# Patient Record
Sex: MALE | Race: WHITE | Employment: OTHER | URBAN - METROPOLITAN AREA
[De-identification: names, ages, dates, MRNs, and addresses within clinical notes are randomized per-mention and may not be internally consistent; named-entity substitution may affect disease eponyms.]

---

## 2019-08-29 ENCOUNTER — HOSPITAL ENCOUNTER (EMERGENCY)
Facility: CLINIC | Age: 69
Discharge: HOME | End: 2019-08-29
Attending: EMERGENCY MEDICINE | Admitting: EMERGENCY MEDICINE
Payer: COMMERCIAL

## 2019-08-29 ENCOUNTER — APPOINTMENT (OUTPATIENT)
Dept: CT IMAGING | Facility: CLINIC | Age: 69
End: 2019-08-29
Attending: EMERGENCY MEDICINE
Payer: COMMERCIAL

## 2019-08-29 ENCOUNTER — APPOINTMENT (OUTPATIENT)
Dept: ULTRASOUND IMAGING | Facility: CLINIC | Age: 69
End: 2019-08-29
Attending: EMERGENCY MEDICINE
Payer: COMMERCIAL

## 2019-08-29 VITALS
OXYGEN SATURATION: 98 % | HEIGHT: 70 IN | WEIGHT: 220 LBS | TEMPERATURE: 97.9 F | RESPIRATION RATE: 18 BRPM | SYSTOLIC BLOOD PRESSURE: 140 MMHG | HEART RATE: 60 BPM | DIASTOLIC BLOOD PRESSURE: 70 MMHG | BODY MASS INDEX: 31.5 KG/M2

## 2019-08-29 DIAGNOSIS — R31.29 MICROSCOPIC HEMATURIA: ICD-10-CM

## 2019-08-29 DIAGNOSIS — N50.811 RIGHT TESTICULAR PAIN: ICD-10-CM

## 2019-08-29 LAB
ALBUMIN UR-MCNC: NEGATIVE MG/DL
APPEARANCE UR: ABNORMAL
BILIRUB UR QL STRIP: NEGATIVE
CAOX CRY #/AREA URNS HPF: ABNORMAL /HPF
COLOR UR AUTO: YELLOW
GLUCOSE UR STRIP-MCNC: NEGATIVE MG/DL
HGB UR QL STRIP: ABNORMAL
KETONES UR STRIP-MCNC: NEGATIVE MG/DL
LEUKOCYTE ESTERASE UR QL STRIP: NEGATIVE
MUCOUS THREADS #/AREA URNS LPF: PRESENT /LPF
NITRATE UR QL: NEGATIVE
PH UR STRIP: 5.5 PH (ref 5–7)
RBC #/AREA URNS AUTO: 41 /HPF (ref 0–2)
SOURCE: ABNORMAL
SP GR UR STRIP: 1.02 (ref 1–1.03)
SQUAMOUS #/AREA URNS AUTO: <1 /HPF (ref 0–1)
UROBILINOGEN UR STRIP-MCNC: NORMAL MG/DL (ref 0–2)
WBC #/AREA URNS AUTO: 1 /HPF (ref 0–5)

## 2019-08-29 PROCEDURE — 93976 VASCULAR STUDY: CPT

## 2019-08-29 PROCEDURE — 81001 URINALYSIS AUTO W/SCOPE: CPT | Performed by: EMERGENCY MEDICINE

## 2019-08-29 PROCEDURE — 74176 CT ABD & PELVIS W/O CONTRAST: CPT

## 2019-08-29 PROCEDURE — 99285 EMERGENCY DEPT VISIT HI MDM: CPT | Mod: 25 | Performed by: EMERGENCY MEDICINE

## 2019-08-29 PROCEDURE — 99284 EMERGENCY DEPT VISIT MOD MDM: CPT | Mod: Z6 | Performed by: EMERGENCY MEDICINE

## 2019-08-29 PROCEDURE — 25000132 ZZH RX MED GY IP 250 OP 250 PS 637: Performed by: EMERGENCY MEDICINE

## 2019-08-29 RX ORDER — ACETAMINOPHEN 325 MG/1
975 TABLET ORAL ONCE
Status: COMPLETED | OUTPATIENT
Start: 2019-08-29 | End: 2019-08-29

## 2019-08-29 RX ORDER — IBUPROFEN 600 MG/1
600 TABLET, FILM COATED ORAL ONCE
Status: COMPLETED | OUTPATIENT
Start: 2019-08-29 | End: 2019-08-29

## 2019-08-29 RX ORDER — TAMSULOSIN HYDROCHLORIDE 0.4 MG/1
0.4 CAPSULE ORAL DAILY
COMMUNITY

## 2019-08-29 RX ADMIN — IBUPROFEN 600 MG: 600 TABLET ORAL at 08:37

## 2019-08-29 RX ADMIN — ACETAMINOPHEN 975 MG: 325 TABLET, FILM COATED ORAL at 08:36

## 2019-08-29 ASSESSMENT — ENCOUNTER SYMPTOMS
FEVER: 0
HEMATURIA: 0
DIFFICULTY URINATING: 0
DYSURIA: 0

## 2019-08-29 ASSESSMENT — MIFFLIN-ST. JEOR: SCORE: 1774.16

## 2019-08-29 NOTE — ED TRIAGE NOTES
Pt arrived in triage with concerns of R testicle pain since 0300 this morning. Pt says he hasn't had pain like this before. Pt denies changes in sexual activity or any trauma. Urinating regularly. Afebrile.

## 2019-08-29 NOTE — ED AVS SNAPSHOT
[facility], [place], Emergency Department  [address] [place] [id number]  Phone:  [phone]                                    Cody Torres   MRN: [id number]    Department:  [facility], Emergency Department   Date of Visit:  8/29/2019           After Visit Summary Signature Page    I have received my discharge instructions, and my questions have been answered. I have discussed any challenges I see with this plan with the nurse or doctor.    ..........................................................................................................................................  Patient/Patient Representative Signature      ..........................................................................................................................................  Patient Representative Print Name and Relationship to Patient    ..................................................               ................................................  Date                                   Time    ..........................................................................................................................................  Reviewed by Signature/Title    ...................................................              ..............................................  Date                                               Time          22EPIC Rev 08/18

## 2019-08-29 NOTE — ED PROVIDER NOTES
History     Chief Complaint   Patient presents with     Groin Pain     HPI  Cody Torres is a 68 year old male who presents with acute nontraumatic right testicular pain that started about 6 hours he rates it at 5 of 10 is not noted swelling no dysuria no abnormal discharge no fevers or chills.  No blood in urine is never had symptoms like this in the past.    Past Medical History:   Diagnosis Date     Diabetes (H)        Social History     Socioeconomic History     Marital status:      Spouse name: Not on file     Number of children: Not on file     Years of education: Not on file     Highest education level: Not on file   Occupational History     Not on file   Social Needs     Financial resource strain: Not on file     Food insecurity:     Worry: Not on file     Inability: Not on file     Transportation needs:     Medical: Not on file     Non-medical: Not on file   Tobacco Use     Smoking status: Never Smoker     Smokeless tobacco: Never Used   Substance and Sexual Activity     Alcohol use: Not on file     Drug use: Never     Sexual activity: Yes   Lifestyle     Physical activity:     Days per week: Not on file     Minutes per session: Not on file     Stress: Not on file   Relationships     Social connections:     Talks on phone: Not on file     Gets together: Not on file     Attends Spiritism service: Not on file     Active member of club or organization: Not on file     Attends meetings of clubs or organizations: Not on file     Relationship status: Not on file     Intimate partner violence:     Fear of current or ex partner: Not on file     Emotionally abused: Not on file     Physically abused: Not on file     Forced sexual activity: Not on file   Other Topics Concern     Not on file   Social History Narrative     Not on file         I have reviewed the Medications, Allergies, Past Medical and Surgical History, and Social History in the Epic system.    Review of Systems   Constitutional: Negative  "for fever.   Genitourinary: Positive for testicular pain. Negative for difficulty urinating, discharge, dysuria, genital sores, hematuria, penile pain, penile swelling and scrotal swelling.   All other systems reviewed and are negative.      Physical Exam   BP: (!) 168/89  Pulse: 82  Temp: 97.9  F (36.6  C)  Resp: 18  Height: 177.8 cm (5' 10\")  Weight: 99.8 kg (220 lb)  SpO2: 97 %      Physical Exam   Constitutional: He is oriented to person, place, and time. He appears well-nourished. No distress.   Pulmonary/Chest: No respiratory distress.   Genitourinary: Right testis shows tenderness.         Genitourinary Comments: Right epididymal tenderness   Neurological: He is alert and oriented to person, place, and time.   Psychiatric: He has a normal mood and affect. His behavior is normal.   Nursing note and vitals reviewed.      ED Course        Procedures        Medications   acetaminophen (TYLENOL) tablet 975 mg (975 mg Oral Given 8/29/19 0836)   ibuprofen (ADVIL/MOTRIN) tablet 600 mg (600 mg Oral Given 8/29/19 0837)            Labs Ordered and Resulted from Time of ED Arrival Up to the Time of Departure from the ED   ROUTINE UA WITH MICROSCOPIC - Abnormal; Notable for the following components:       Result Value    Blood Urine Moderate (*)     RBC Urine 41 (*)     Mucous Urine Present (*)     Calcium Oxalate Moderate (*)     All other components within normal limits            Assessments & Plan (with Medical Decision Making)   Cody Torres is a pleasant 68-year-old generally quite healthy, presented with acute right testicular pain. His exam was unremarkable his testicular ultrasound was normal he did have microscopic hematuria so I proceeded to CT scan. He has a small calculus in the right kidney but no evidence for recurrent ureteral or bladder stone.  It is possible that he did pass a small stone. I gave him the referred pain to the testicle and also microscopic hematuria. We did have a long discussion about " the importance of follow-up and patient hematuria is cancer until proven otherwise. I will give the number the urology clinic he can go to his own clinic and get a repeat UA if it shows 0 red cells. He is probably good but anything else he requires a direct referral to the urology clinic.  This part of the medical record was transcribed by Marck Vora Medical Scribe, from a dictation done by Cody Romano MD.     I have reviewed the nursing notes.    I have reviewed the findings, diagnosis, plan and need for follow up with the patient.    Discharge Medication List as of 8/29/2019  1:22 PM          Final diagnoses:   Right testicular pain   Microscopic hematuria       8/29/2019   [facility], [place], EMERGENCY DEPARTMENT     Cody Romano MD  09/02/19 0948

## 2019-08-29 NOTE — DISCHARGE INSTRUCTIONS
The ultrasound of the testicles was normal  The CT scan showed no abnormality  Your urinalysis showed microscopic hematuria, this needs to be investigated by a urologist.  See the number below to schedule an appointment.  Please make an appointment to follow up with Urology Clinic (phone: [phone]) as soon as possible.

## 2019-10-03 ENCOUNTER — HEALTH MAINTENANCE LETTER (OUTPATIENT)
Age: 69
End: 2019-10-03

## 2020-02-08 ENCOUNTER — HEALTH MAINTENANCE LETTER (OUTPATIENT)
Age: 70
End: 2020-02-08

## 2020-11-07 ENCOUNTER — HEALTH MAINTENANCE LETTER (OUTPATIENT)
Age: 70
End: 2020-11-07

## 2021-03-27 ENCOUNTER — HEALTH MAINTENANCE LETTER (OUTPATIENT)
Age: 71
End: 2021-03-27

## 2021-09-05 ENCOUNTER — HEALTH MAINTENANCE LETTER (OUTPATIENT)
Age: 71
End: 2021-09-05

## 2021-10-07 ENCOUNTER — ANCILLARY PROCEDURE (OUTPATIENT)
Dept: GENERAL RADIOLOGY | Facility: CLINIC | Age: 71
End: 2021-10-07
Attending: FAMILY MEDICINE
Payer: COMMERCIAL

## 2021-10-07 ENCOUNTER — OFFICE VISIT (OUTPATIENT)
Dept: URGENT CARE | Facility: URGENT CARE | Age: 71
End: 2021-10-07
Payer: COMMERCIAL

## 2021-10-07 VITALS
OXYGEN SATURATION: 97 % | DIASTOLIC BLOOD PRESSURE: 72 MMHG | HEART RATE: 85 BPM | SYSTOLIC BLOOD PRESSURE: 131 MMHG | TEMPERATURE: 97.7 F

## 2021-10-07 DIAGNOSIS — M25.572 ACUTE LEFT ANKLE PAIN: Primary | ICD-10-CM

## 2021-10-07 DIAGNOSIS — M25.572 ACUTE LEFT ANKLE PAIN: ICD-10-CM

## 2021-10-07 DIAGNOSIS — S82.831A CLOSED FRACTURE OF DISTAL END OF RIGHT FIBULA, UNSPECIFIED FRACTURE MORPHOLOGY, INITIAL ENCOUNTER: ICD-10-CM

## 2021-10-07 DIAGNOSIS — S09.90XA CLOSED HEAD INJURY, INITIAL ENCOUNTER: ICD-10-CM

## 2021-10-07 PROCEDURE — 99204 OFFICE O/P NEW MOD 45 MIN: CPT | Performed by: FAMILY MEDICINE

## 2021-10-07 PROCEDURE — 73610 X-RAY EXAM OF ANKLE: CPT | Mod: LT | Performed by: RADIOLOGY

## 2021-10-08 NOTE — PATIENT INSTRUCTIONS
Patient Education     Ankle Fracture, Distal Fibula  You have a fracture, or broken bone, of the end of the fibula bone. The fibula is one of two bones that support the ankle joint.     Home care    You will be given a splint, cast, or special boot to prevent movement at the injury site. Do not put weight on a splint. It will break. Follow your healthcare provider s advice about when to begin bearing weight on a cast or boot.    Keep your leg raised when sitting or lying down. When sleeping, place a pillow under the injured leg. When sitting, support the injured leg so it is above heart level. This is very important during the first 48 hours.    Keep the cast or splint completely dry at all times. When bathing, protect the cast or splint with 2 large plastic bags. Place 1 bag outside of the other. Tape each bag with duct tape at the top end or use rubber bands. Water can still leak in even when the foot is covered. So it's best to keep the cast, splint, or boot away from water. If a fiberglass cast or splint gets wet, dry it with a hair dryer on a cool setting.    Place an ice pack over the injured area for no more than 15 to 20 minutes. Do this every 3 to 6 hours for the first 24 to 48 hours. Continue this 3 to 4 times a day as needed. To make an ice pack, put ice cubes in a plastic bag that seals at the top. Wrap the bag in a clean, thin towel or cloth. Never put ice or an ice pack directly on the skin. The ice pack can be put right on the cast or splint. As the ice melts, be careful that the cast or splint doesn t get wet.    You may use over-the-counter pain medicine to control pain, unless another pain medicine was prescribed. If you have chronic liver or kidney disease or ever had a stomach ulcer or GI bleeding, talk with your provider before using these medicines.    Follow-up care  Follow up with your healthcare provider in 1 week, or as advised. This is to be sure the bone is healing properly. If you were  given a splint, it may be changed to a cast or boot after the swelling goes down.   If X-rays were taken, you will be told of any new findings that may affect your care.  When to seek medical advice  Call your healthcare provider right away if any of these occur:    The plaster cast or splint becomes wet or soft    The fiberglass cast or splint stays wet for more than 24 hours    There is increased tightness or pain under the cast or splint    Your toes become swollen, cold, blue, numb, or tingly    The cast or splint becomes loose    The cast or splint has a bad smell    Sore areas develop under the cast or splint    The cast or splint develops cracks or breaks   [employer] last reviewed this educational content on 4/1/2018 2000-2021 [employer]. All rights reserved. This information is not intended as a substitute for professional medical care. Always follow your healthcare professional's instructions.      Patient Education     Head Trauma (Traumatic Brain Injury)     After treatment for head trauma, know which symptoms to watch for. Then call for medical help.     Head trauma can be fatal. The effects from some types of head trauma may not appear right away. So it s important to get immediate medical attention for any head injury.   Don't move a person with a head injury unless it is necessary to save his or her life. Call 911 and wait for help. Head trauma often comes with severe neck injury. Sudden movements can result in paralysis.   Call 911  Call 911 immediately after a head blow that results in:     Prolonged loss of consciousness (more than a few seconds) or prolonged drowsiness    Memory problems or confusion    Severe headache    Nausea or vomiting    Pupils dilated or different sizes    Severe bleeding    Blood or watery fluid leaking from nose or ears    Broken skull or a soft spot on skull    Slow breathing    Loss of balance    Weakness of or trouble using an arm or leg    Slurred  speech    Seizure  What to expect in the ER  Here is what will happen:     A neurological exam is done. This is a series of simple questions and tests that evaluate the nervous system. Reflexes, movement, response to commands, response to pain, and mental state are assessed.    The healthcare provider shines a bright light into the eyes to check how the pupils respond. This can reveal more about any head injuries.    A CT scan may be done. This test combines X-rays and computer scans to create detailed images of the brain to detect bleeding, swelling, brain injury, and skull fractures    An MRI scan may be done. This test detects minute bleeding (microhemorrhage), bruising, and scarring which may not be visible on CT scanning.  Treatment for head trauma  Here is what is generally done:     Sometimes, severe head injuries cause bleeding on the brain that needs to be treated right away with surgery. In certain cases, the injured person will be watched closely and taken for surgery only if injuries become worse. After surgery, special care helps prevent further brain damage.    Minor head trauma may need little treatment beyond pain control and observation. The healthcare provider may suggest using cold packs to reduce swelling and pain.  Once you are home  At home, call 911 immediately if the affected person:     Becomes very drowsy or confused    Has a headache or trouble seeing    Has a stiff neck or muscle weakness    Vomits    Has seizures    Has bruising around the eyes or behind the ears    Has any blood or clear fluid coming out of the ears or nose  Beka last reviewed this educational content on 5/1/2018 2000-2021 [employer]. All rights reserved. This information is not intended as a substitute for professional medical care. Always follow your healthcare professional's instructions.

## 2021-10-08 NOTE — PROGRESS NOTES
HPI:Cody Torres is a 70 year old male here today with complaint of left tankle pain    Accompanied by wife    Just a couple of hours prior to me seeing him  Patient was cleaning a low deck , he slipped and fell landed mostly on his leg and slightly landed backward having mild head trauma (per patient minimal)  Slipped from a height of about 2.5 feet  He states he was bracing himself as he was coming down and his left ankle took most of the fall     head injury:No  loss of consciousness:  No  syncope or presyncope: No  chest pain or palpitation: No  mechanical fall:  Yes  using assistive devices:  No  blood thinners: No  Pregnant: No    ROS:  as per hpi  denies headache  denies any nausea or vomiting  denies any amnesia, confusion or concussion symptoms  denies any blurring of vision  denies any otorrhea or rhinorhea  denies any neck pain  denies any back pain.  denies any chest pain or shortness of breath  denies any joint pain except noted above.  denies any bowel or bladder incontinence or motor or sensory deficits.  denies any abdominal pain, nausea or vomiting or flank pain  denies any hematuria      Allergies   Allergen Reactions     Amoxicillin Rash     Levaquin [Levofloxacin] Palpitations     Morphine Rash     Tetracycline Rash       Past Medical History:   Diagnosis Date     Diabetes (H)        metFORMIN (GLUCOPHAGE) 1000 MG tablet, Take 1,000 mg by mouth 2 times daily (with meals)  tamsulosin (FLOMAX) 0.4 MG capsule, Take 0.4 mg by mouth daily    No current facility-administered medications on file prior to visit.      Social History     Tobacco Use     Smoking status: Never Smoker     Smokeless tobacco: Never Used   Substance Use Topics     Alcohol use: Not on file     Drug use: Never       ROS:  10 point review of systems negative except for noted above.   No thoughts of harming self or others.     OBJECTIVE:  /72   Pulse 85   Temp 97.7  F (36.5  C) (Tympanic)   SpO2 97%    General:    awake, alert, and cooperative.  NAD.   Head: Normocephalic, atraumatic.  Eyes: Conjunctiva clear,   ENT: no periorbital ecchymosis, no otorrhea or rhinorrhea, negative Garcia's sign, no raccoon eyes, no hematympanum  Heart: Regular rate and rhythm. No murmur.  Lungs: Chest is clear; no wheezes or rales.   Abdomen: soft non-tender. No bruising noted.   Neuro: Alert and oriented - normal speech. Cranial nerves intact, MMT 5/5 all extremities, sensory intact, normal gait and normal cerebellar function  MS: Using extremities freely except for left leg , No cervical, thoracic, or lumbar spine tenderness  Ankle Exam (left):  Inspection:swelling around the lateral malleolus  Palpation:tender over lateral malleolus no tenderness on navicular bone or 5th metatarsal. No proximal fibular tenderness. No calf tenderness. Achilles tendon is intact  Cap refill intact.    Good doralis pedis.  Neurovascularly Intact Distally.   PSYCH:  Normal affect, normal speech  SKIN: no obvious rashes    xrays to my review positive distal fibular fracture     ASSESSMENT:    ICD-10-CM    1. Acute left ankle pain  M25.572 XR Ankle Left G/E 3 Views   2. Closed head injury, initial encounter  S09.90XA    3. Closed fracture of distal end of right fibula, unspecified fracture morphology, initial encounter  S82.831A Orthopedic  Referral     Rolling Knee Walker Order for DME - ONLY FOR DME       PLAN:   Head injury - I recommended ER patient declined.  He states he's very certain it was a low impact fall  I stated given his age it is best for him to get a CT but he declines   Risks discussed  Signs and symptoms of concussion discussed. If with worsening symptoms or concerns proceed to ER  Aware to go to the ER if with worsening symptoms of headache, nausea or vomiting, chest pain or shortness of breath, bowel/bladder incontinence, motor or sensory deficits, bloody urine, changes in behavior, confusion, difficulty walking or seizure  Advised  about symptoms which might herald more serious problems.      Left ankle /distal fibular fracture  Given tall black camwalker  Crutches tonight - may try to go to [employer] store to get a rolling knee walker  Referred to orthopedics for follow up  DVT prevention discussed  Signs or symptoms of DVT reviewed if concerns he will go tto ER       Stephanie Blackburn MD

## 2021-10-15 ENCOUNTER — OFFICE VISIT (OUTPATIENT)
Dept: PODIATRY | Facility: CLINIC | Age: 71
End: 2021-10-15
Attending: FAMILY MEDICINE
Payer: COMMERCIAL

## 2021-10-15 VITALS
SYSTOLIC BLOOD PRESSURE: 130 MMHG | DIASTOLIC BLOOD PRESSURE: 79 MMHG | BODY MASS INDEX: 31.5 KG/M2 | WEIGHT: 220 LBS | HEART RATE: 91 BPM | HEIGHT: 70 IN

## 2021-10-15 DIAGNOSIS — S82.65XA CLOSED NONDISPLACED FRACTURE OF LATERAL MALLEOLUS OF LEFT FIBULA, INITIAL ENCOUNTER: Primary | ICD-10-CM

## 2021-10-15 PROCEDURE — 99203 OFFICE O/P NEW LOW 30 MIN: CPT | Performed by: PODIATRIST

## 2021-10-15 RX ORDER — LANOLIN ALCOHOL/MO/W.PET/CERES
1000 CREAM (GRAM) TOPICAL DAILY
COMMUNITY
Start: 2021-02-05

## 2021-10-15 RX ORDER — BLOOD SUGAR DIAGNOSTIC
STRIP MISCELLANEOUS
COMMUNITY
Start: 2020-11-03

## 2021-10-15 ASSESSMENT — PAIN SCALES - GENERAL: PAINLEVEL: MILD PAIN (3)

## 2021-10-15 ASSESSMENT — MIFFLIN-ST. JEOR: SCORE: 1764.16

## 2021-10-15 NOTE — PROGRESS NOTES
PATIENT HISTORY:  Cody Torres is a 70 year old male who presents with a chief complaint of a painful left ankle.  The patient relates injuring the left ankle on 10/07/2021 while at mother in law's house.  The patient states that cleaning the deck and slipped and fell  The patient relates pain with weight bearing to the left.   The patient relates being seen and treated with ice, elevation, and nonweightbearing with crutches.  The patient denies any numbness to the toes on the left foot.    REVIEW OF SYSTEMS:  Constitutional, HEENT, cardiovascular, pulmonary, GI, , musculoskeletal, neuro, skin, endocrine and psych systems are negative, except as otherwise noted.     PAST MEDICAL HISTORY:   Past Medical History:   Diagnosis Date     Diabetes (H)         PAST SURGICAL HISTORY: No past surgical history on file.     MEDICATIONS:   Current Outpatient Medications:      metFORMIN (GLUCOPHAGE) 1000 MG tablet, Take 1,000 mg by mouth 2 times daily (with meals), Disp: , Rfl:      tamsulosin (FLOMAX) 0.4 MG capsule, Take 0.4 mg by mouth daily, Disp: , Rfl:      ALLERGIES:    Allergies   Allergen Reactions     Amoxicillin Rash     Levaquin [Levofloxacin] Palpitations     Morphine Rash     Tetracycline Rash        SOCIAL HISTORY:   Social History     Socioeconomic History     Marital status:      Spouse name: Not on file     Number of children: Not on file     Years of education: Not on file     Highest education level: Not on file   Occupational History     Not on file   Tobacco Use     Smoking status: Never Smoker     Smokeless tobacco: Never Used   Substance and Sexual Activity     Alcohol use: Not on file     Drug use: Never     Sexual activity: Yes   Other Topics Concern     Not on file   Social History Narrative     Not on file     Social Determinants of Health     Financial Resource Strain:      Difficulty of Paying Living Expenses:    Food Insecurity:      Worried About Running Out of Food in the Last Year:       Ran Out of Food in the Last Year:    Transportation Needs:      Lack of Transportation (Medical):      Lack of Transportation (Non-Medical):    Physical Activity:      Days of Exercise per Week:      Minutes of Exercise per Session:    Stress:      Feeling of Stress :    Social Connections:      Frequency of Communication with Friends and Family:      Frequency of Social Gatherings with Friends and Family:      Attends Denominational Services:      Active Member of Clubs or Organizations:      Attends Club or Organization Meetings:      Marital Status:    Intimate Partner Violence:      Fear of Current or Ex-Partner:      Emotionally Abused:      Physically Abused:      Sexually Abused:         FAMILY HISTORY: No family history on file.     EXAM:Vitals: There were no vitals taken for this visit.  BMI= There is no height or weight on file to calculate BMI.     General appearance: Patient is alert and fully cooperative with history & exam.  No sign of distress is noted during the visit.     Psychiatric: Affect is pleasant & appropriate.  Patient appears motivated to improve health.     Respiratory: Breathing is regular & unlabored while sitting.     HEENT: Hearing is intact to spoken word.  Speech is clear.  No gross evidence of visual impairment that would impact ambulation.     Dermatologic: Skin is intact with no laceration for fracture blisters.        Vascular: DP & PT pulses are difficult to palpate due to the edema.  CFT and skin temperature is normal to both lower extremities.     Neurologic: Lower extremity sensation is intact to light touch.  No evidence of weakness or contracture in the lower extremities.        Musculoskeletal: One notes decreased ankle joint ROM due to swelling and pain on the left.  Point of maximum tenderness located over the  lateral aspect of the left ankle.  One notes no pain with palpation over the medial deltoid ligament on the left.  Moderate edema noted.  Positive ecchymosis  noted.      Radiograph evaluation including AP, lateral and mortise views of the left ankle reveals a spiral oblique fracture of the lateral malleolus extending at the level of the ankle mortise proximally and posteriorly.  No evidence of a transverse medial malleolar fracture.     ASSESSMENT / PLAN:     ICD-10-CM    1. Closed nondisplaced fracture of lateral malleolus of left fibula, initial encounter  S82.65XA        I have explained to Cody  about the conditions.  We discussed the nature of the condition as well as the treatment plan and expected length of recovery.  At this point, I am recommending conservative treatment of the ankle fracture.  The patient was instructed on continued offloading of the left ankle.  The patient was instructed to perform light range of motion exercises in a bucket of warm water to promote healing.  The patient will return in one month for reevaluation and repeat x-rays.    Cody verbalized agreement with and understanding of the rational for the diagnosis and treatment plan.  All questions were answered to best of my ability and the patient's satisfaction. The patient was advised to contact the clinic with any questions that may arise after the clinic visit.      Disclaimer: This note consists of symbols derived from keyboarding, dictation and/or voice recognition software. As a result, there may be errors in the script that have gone undetected. Please consider this when interpreting information found in this chart.       DANYEL Alfaro D.P.M., F.TAZ.C.F.A.S.

## 2021-10-15 NOTE — NURSING NOTE
"Chief Complaint   Patient presents with     Fracture     left ankle       Initial /79   Pulse 91   Ht 1.778 m (5' 10\")   Wt 99.8 kg (220 lb)   BMI 31.57 kg/m   Estimated body mass index is 31.57 kg/m  as calculated from the following:    Height as of this encounter: 1.778 m (5' 10\").    Weight as of this encounter: 99.8 kg (220 lb).  Medications and allergies reviewed.      Iris VENTURA MA    "

## 2021-10-15 NOTE — PATIENT INSTRUCTIONS
Caring for your injured foot or ankle    1. 1-3 days after injury  a. Rest and immobilize the injured foot or ankle.  b. Ice injured area 20/hour during the day if possible.  c. Compression, such as an ace wrap, will help reduce swelling.  d. Elevate the injured foot/ankle as much as possible  2. 4 days to 4 weeks after injury  a. Contrast soaking  i. Get two 5-gallon buckets, one ice water the other warm water  ii. Start soaking the injured foot/ankle in the ice water for up to 5 minutes then switch to the warm water for 5 minutes  iii. Repeat this once then finish with one more soak in the ice water  b. Perform simple range of motion exercises while soaking  c. Massage the injured tissue gently with a topical muscle rub  d. Weight-bear as tolerated depending upon pain (this is not the type of pain that you should push through)  3. 4+ weeks from injury  a. Increase activity as tolerated  b. Wear supportive shoes to  offload the tension forces and prevent future tissue damage.    Please notify the office if at any point there is increased pain, swelling or redness.

## 2021-10-15 NOTE — LETTER
10/15/2021         RE: Cody Torres  [address] [facility] [address]        Dear Colleague,    Thank you for referring your patient, Cody Torres, to the [facility]. Please see a copy of my visit note below.    PATIENT HISTORY:  Cody Torres is a 70 year old male who presents with a chief complaint of a painful left ankle.  The patient relates injuring the left ankle on 10/07/2021 while at mother in law's house.  The patient states that cleaning the deck and slipped and fell  The patient relates pain with weight bearing to the left.   The patient relates being seen and treated with ice, elevation, and nonweightbearing with crutches.  The patient denies any numbness to the toes on the left foot.    REVIEW OF SYSTEMS:  Constitutional, HEENT, cardiovascular, pulmonary, GI, , musculoskeletal, neuro, skin, endocrine and psych systems are negative, except as otherwise noted.     PAST MEDICAL HISTORY:   Past Medical History:   Diagnosis Date     Diabetes (H)         PAST SURGICAL HISTORY: No past surgical history on file.     MEDICATIONS:   Current Outpatient Medications:      metFORMIN (GLUCOPHAGE) 1000 MG tablet, Take 1,000 mg by mouth 2 times daily (with meals), Disp: , Rfl:      tamsulosin (FLOMAX) 0.4 MG capsule, Take 0.4 mg by mouth daily, Disp: , Rfl:      ALLERGIES:    Allergies   Allergen Reactions     Amoxicillin Rash     Levaquin [Levofloxacin] Palpitations     Morphine Rash     Tetracycline Rash        SOCIAL HISTORY:   Social History     Socioeconomic History     Marital status:      Spouse name: Not on file     Number of children: Not on file     Years of education: Not on file     Highest education level: Not on file   Occupational History     Not on file   Tobacco Use     Smoking status: Never Smoker     Smokeless tobacco: Never Used   Substance and Sexual Activity     Alcohol use: Not on file     Drug use: Never     Sexual activity:  Yes   Other Topics Concern     Not on file   Social History Narrative     Not on file     Social Determinants of Health     Financial Resource Strain:      Difficulty of Paying Living Expenses:    Food Insecurity:      Worried About Running Out of Food in the Last Year:      Ran Out of Food in the Last Year:    Transportation Needs:      Lack of Transportation (Medical):      Lack of Transportation (Non-Medical):    Physical Activity:      Days of Exercise per Week:      Minutes of Exercise per Session:    Stress:      Feeling of Stress :    Social Connections:      Frequency of Communication with Friends and Family:      Frequency of Social Gatherings with Friends and Family:      Attends [employer] Services:      Active Member of Clubs or Organizations:      Attends Club or Organization Meetings:      Marital Status:    Intimate Partner Violence:      Fear of Current or Ex-Partner:      Emotionally Abused:      Physically Abused:      Sexually Abused:         FAMILY HISTORY: No family history on file.     EXAM:Vitals: There were no vitals taken for this visit.  BMI= There is no height or weight on file to calculate BMI.     General appearance: Patient is alert and fully cooperative with history & exam.  No sign of distress is noted during the visit.     Psychiatric: Affect is pleasant & appropriate.  Patient appears motivated to improve health.     Respiratory: Breathing is regular & unlabored while sitting.     HEENT: Hearing is intact to spoken word.  Speech is clear.  No gross evidence of visual impairment that would impact ambulation.     Dermatologic: Skin is intact with no laceration for fracture blisters.        Vascular: DP & PT pulses are difficult to palpate due to the edema.  CFT and skin temperature is normal to both lower extremities.     Neurologic: Lower extremity sensation is intact to light touch.  No evidence of weakness or contracture in the lower extremities.        Musculoskeletal: One notes  decreased ankle joint ROM due to swelling and pain on the left.  Point of maximum tenderness located over the  lateral aspect of the left ankle.  One notes no pain with palpation over the medial deltoid ligament on the left.  Moderate edema noted.  Positive ecchymosis noted.      Radiograph evaluation including AP, lateral and mortise views of the left ankle reveals a spiral oblique fracture of the lateral malleolus extending at the level of the ankle mortise proximally and posteriorly.  No evidence of a transverse medial malleolar fracture.     ASSESSMENT / PLAN:     ICD-10-CM    1. Closed nondisplaced fracture of lateral malleolus of left fibula, initial encounter  S82.65XA        I have explained to Cody  about the conditions.  We discussed the nature of the condition as well as the treatment plan and expected length of recovery.  At this point, I am recommending conservative treatment of the ankle fracture.  The patient was instructed on continued offloading of the left ankle.  The patient was instructed to perform light range of motion exercises in a bucket of warm water to promote healing.  The patient will return in one month for reevaluation and repeat x-rays.    Cody verbalized agreement with and understanding of the rational for the diagnosis and treatment plan.  All questions were answered to best of my ability and the patient's satisfaction. The patient was advised to contact the clinic with any questions that may arise after the clinic visit.      Disclaimer: This note consists of symbols derived from keyboarding, dictation and/or voice recognition software. As a result, there may be errors in the script that have gone undetected. Please consider this when interpreting information found in this chart.       DANYEL Alfaro D.P.M., F.A.C.F.A.S.        Again, thank you for allowing me to participate in the care of your patient.        Sincerely,        Caleb Alfaro DPM

## 2021-11-12 ENCOUNTER — OFFICE VISIT (OUTPATIENT)
Dept: PODIATRY | Facility: CLINIC | Age: 71
End: 2021-11-12
Payer: COMMERCIAL

## 2021-11-12 ENCOUNTER — ANCILLARY PROCEDURE (OUTPATIENT)
Dept: GENERAL RADIOLOGY | Facility: CLINIC | Age: 71
End: 2021-11-12
Attending: PODIATRIST
Payer: COMMERCIAL

## 2021-11-12 VITALS
BODY MASS INDEX: 31.5 KG/M2 | WEIGHT: 220 LBS | HEIGHT: 70 IN | SYSTOLIC BLOOD PRESSURE: 128 MMHG | HEART RATE: 82 BPM | DIASTOLIC BLOOD PRESSURE: 79 MMHG

## 2021-11-12 DIAGNOSIS — S82.65XD CLOSED NONDISPLACED FRACTURE OF LATERAL MALLEOLUS OF LEFT FIBULA WITH ROUTINE HEALING, SUBSEQUENT ENCOUNTER: Primary | ICD-10-CM

## 2021-11-12 PROCEDURE — 73610 X-RAY EXAM OF ANKLE: CPT | Mod: LT | Performed by: RADIOLOGY

## 2021-11-12 PROCEDURE — 99213 OFFICE O/P EST LOW 20 MIN: CPT | Performed by: PODIATRIST

## 2021-11-12 ASSESSMENT — MIFFLIN-ST. JEOR: SCORE: 1764.16

## 2021-11-12 NOTE — PATIENT INSTRUCTIONS
Next Steps:      1. Support:  a. Wear supportive shoes, sandals, boots and/or inserts that have a rigid supportive sole.    i. This will offload the majority of tension forces that travel through your feet every step you take.    1. Skechers Max Cushioning Elite/Premier   2. Skechers Relax Fit D'Lux Walker  3. Reebok Walk Ultra 7 DMX MAX   4. Hoka Bondi walking shoes  5. Superfeet inserts ([email or web address])  b. It is important that you also wear supportive shoe wear in the house to continue providing support to your feet.    c. You may always use a cushioned liner for your shoes if that makes your feet feel better.  2. Stretching  a. Calf stretching is essential to offload the tension forces that travel through your feet every step you take  b. Preferred calf stretch is the Runner's Stretch  i. Place one foot behind the other foot, flat against the ground (it is important to keep the heel on the ground).  The back leg is the one that will be stretched.  1. Start with the knee straight and lean your hips into the wall, counter or whatever you are leaning into - count to ten.  2. Next, bend the knee.  You should feel the stretch lower in the calf muscle - count to ten.  c. Repeat this stretch once an hour to start off with.  When symptoms subside, I recommend performing the stretch 3 times daily to prevent any future problems.                3. Tissue Massage  a. It is important that you physically loosen the inflammation tissue to help your body heal the injured tissue.  b. I recommend soaking your foot in warm water to increase the microcirculation to the soft tissues.  You may add Epson salt to the water if you prefer.  c. You may apply an over-the-counter muscle rub, such as Icy Hot roll-on, and deeply massage the injured tissue.  4. Reduce Inflammation  a. You can ice the injured tissue with an ice pack with a light cloth covering or soaking in ice water 20 minutes to reduce any acute inflammation, typically at  the end of the day.  b. If tolerated, you may take a Non-Steroidal Antiinflammatory medication (NSAID), such as Advil or Aleve, to help reduce the inflammation tissue.  This can help the overall healing of the injured tissue.  i. It is important to take food with any NSAID medication as the most common side effect is stomach irritation.  If you encounter any problems when taking NSAID, it is recommended that you stop taking the medication and notify your provider.    It is important to understand that most problems that develop in the foot and ankle are caused by excessive tension that cause microinjury to the soft tissues and inflammation in the foot and ankle.  By addressing the underlying causes with support and stretching as well as treating the current inflammatory conditions with tissue massage and anti-inflammatory treatments, most foot and ankle musculoskeletal conditions will resolve.  This may take time to heal.  However, if symptoms persist past 4 weeks you should return to the office for reevaluation to determine further treatment options.      Flu vaccines are now available at all [facility] clinics and retail pharmacies across the [facility]. Appointments are required for clinic locations. To schedule an appointment online, please log into [employer] or create an account if you are a new user. You can also call [phone], or simply walk in at one of the [facility] retail pharmacy locations.      [employer] - Login Page

## 2021-11-12 NOTE — LETTER
"    11/12/2021         RE: Cody Torres  [address] [facility] [address]        Dear Colleague,    Thank you for referring your patient, Cody Torres, to the [facility] [place]. Please see a copy of my visit note below.    Cody returns to the office for reevaluation of the left ankle.  The patient relates following the instructions given at the last visit with noted overall less pain and more improvement in function of the left ankle.   The patient relates no other problems.    Vitals: /79   Pulse 82   Ht 1.778 m (5' 10\")   Wt 99.8 kg (220 lb)   BMI 31.57 kg/m    BMI= Body mass index is 31.57 kg/m .    Lower Extremity Physical Exam:      Neurovascular status remains unchanged.  Muscular exam is within normal limits to major muscle groups.  Integument is intact.      Noted decreased pain with normal ankle range of motion noted.    Diagnostics:  Radiograph evaluation including three views of the left ankle reveals interval healing with increased trabeculation of the distal fibular fracture with bone callus formation noted.  I personally evaluated the images as well as reviewed the images with the patient pointing out the findings.      Assessment:     ICD-10-CM    1. Closed nondisplaced fracture of lateral malleolus of left fibula with routine healing, subsequent encounter  S82.65XD XR Ankle Left G/E 3 Views     Ankle/Foot Bracing Supplies DME       Plan:    I have explained to Cody about the progress of the conditions.  At this time, the patient was educated on the importance of offloading supportive shoes and other devices.  I demonstrated to the patient calf stretches to perform every hour daily until symptoms resolve.  After symptoms resolve, the patient was advised to perform the stretches 3 times daily to prevent future recurrence.  The patient was instructed to perform warm soaks with Epson salt after which to also apply over-the-counter Voltaren gel " to deeply massage the injured tissue.  The patient was instructed to do this on a daily basis until symptoms resolve.  The patient may also take over-the-counter NSAID medication, if tolerated, to help further reduce the inflammation tissue.   The patient was advised to take this type of medication with food to prevent stomach irritation and to stop taking the medication if stomach irritation occurs.  The patient was fitted with a Trilok ankle brace that will aid in offloading the tension forces to the soft tissues and prevent further inflammation.   The patient will return in four weeks for reevaluation if the symptoms do not resolve.    Cody verbalized agreement with and understanding of the rational for the diagnosis and treatment plan.  All questions were answered to best of my ability and the patient's satisfaction. The patient was advised to contact the clinic with any questions that may arise after the clinic visit.      Disclaimer: This note consists of symbols derived from keyboarding, dictation and/or voice recognition software. As a result, there may be errors in the script that have gone undetected. Please consider this when interpreting information found in this chart.       AUDREY Gomez.P.BLAKE., F.A.C.F.A.S.        Again, thank you for allowing me to participate in the care of your patient.        Sincerely,        Caleb Alfaro DPM

## 2021-11-12 NOTE — NURSING NOTE
"Chief Complaint   Patient presents with     RECHECK     left ankle       Initial /79   Pulse 82   Ht 1.778 m (5' 10\")   Wt 99.8 kg (220 lb)   BMI 31.57 kg/m   Estimated body mass index is 31.57 kg/m  as calculated from the following:    Height as of this encounter: 1.778 m (5' 10\").    Weight as of this encounter: 99.8 kg (220 lb).  Medications and allergies reviewed.      Iris VENTURA MA    "

## 2021-11-12 NOTE — PROGRESS NOTES
"Cody returns to the office for reevaluation of the left ankle.  The patient relates following the instructions given at the last visit with noted overall less pain and more improvement in function of the left ankle.   The patient relates no other problems.    Vitals: /79   Pulse 82   Ht 1.778 m (5' 10\")   Wt 99.8 kg (220 lb)   BMI 31.57 kg/m    BMI= Body mass index is 31.57 kg/m .    Lower Extremity Physical Exam:      Neurovascular status remains unchanged.  Muscular exam is within normal limits to major muscle groups.  Integument is intact.      Noted decreased pain with normal ankle range of motion noted.    Diagnostics:  Radiograph evaluation including three views of the left ankle reveals interval healing with increased trabeculation of the distal fibular fracture with bone callus formation noted.  I personally evaluated the images as well as reviewed the images with the patient pointing out the findings.      Assessment:     ICD-10-CM    1. Closed nondisplaced fracture of lateral malleolus of left fibula with routine healing, subsequent encounter  S82.65XD XR Ankle Left G/E 3 Views     Ankle/Foot Bracing Supplies DME       Plan:    I have explained to Cody about the progress of the conditions.  At this time, the patient was educated on the importance of offloading supportive shoes and other devices.  I demonstrated to the patient calf stretches to perform every hour daily until symptoms resolve.  After symptoms resolve, the patient was advised to perform the stretches 3 times daily to prevent future recurrence.  The patient was instructed to perform warm soaks with Epson salt after which to also apply over-the-counter Voltaren gel to deeply massage the injured tissue.  The patient was instructed to do this on a daily basis until symptoms resolve.  The patient may also take over-the-counter NSAID medication, if tolerated, to help further reduce the inflammation tissue.   The patient was advised to " take this type of medication with food to prevent stomach irritation and to stop taking the medication if stomach irritation occurs.  The patient was fitted with a Trilok ankle brace that will aid in offloading the tension forces to the soft tissues and prevent further inflammation.   The patient will return in four weeks for reevaluation if the symptoms do not resolve.    Cody verbalized agreement with and understanding of the rational for the diagnosis and treatment plan.  All questions were answered to best of my ability and the patient's satisfaction. The patient was advised to contact the clinic with any questions that may arise after the clinic visit.      Disclaimer: This note consists of symbols derived from keyboarding, dictation and/or voice recognition software. As a result, there may be errors in the script that have gone undetected. Please consider this when interpreting information found in this chart.       DANYEL Alfaro D.P.M., F.AILEEN.F.A.S.

## 2022-04-17 ENCOUNTER — HEALTH MAINTENANCE LETTER (OUTPATIENT)
Age: 72
End: 2022-04-17

## 2022-10-23 ENCOUNTER — HEALTH MAINTENANCE LETTER (OUTPATIENT)
Age: 72
End: 2022-10-23

## 2023-06-01 ENCOUNTER — HEALTH MAINTENANCE LETTER (OUTPATIENT)
Age: 73
End: 2023-06-01

## 2024-08-17 ENCOUNTER — HEALTH MAINTENANCE LETTER (OUTPATIENT)
Age: 74
End: 2024-08-17